# Patient Record
Sex: MALE | Race: WHITE | NOT HISPANIC OR LATINO | Employment: FULL TIME | URBAN - METROPOLITAN AREA
[De-identification: names, ages, dates, MRNs, and addresses within clinical notes are randomized per-mention and may not be internally consistent; named-entity substitution may affect disease eponyms.]

---

## 2023-04-18 ENCOUNTER — HOSPITAL ENCOUNTER (OUTPATIENT)
Dept: DATA CONVERSION | Facility: HOSPITAL | Age: 53
End: 2023-04-18
Attending: ORTHOPAEDIC SURGERY | Admitting: ORTHOPAEDIC SURGERY

## 2023-04-18 DIAGNOSIS — M76.72 PERONEAL TENDINITIS, LEFT LEG: ICD-10-CM

## 2023-04-20 LAB
COMPLETE PATHOLOGY REPORT: NORMAL
CONVERTED CLINICAL DIAGNOSIS-HISTORY: NORMAL
CONVERTED FINAL DIAGNOSIS: NORMAL
CONVERTED FINAL REPORT PDF LINK TO COPY AND PASTE: NORMAL
CONVERTED GROSS DESCRIPTION: NORMAL

## 2023-09-07 VITALS
RESPIRATION RATE: 16 BRPM | WEIGHT: 237.44 LBS | HEART RATE: 82 BPM | SYSTOLIC BLOOD PRESSURE: 121 MMHG | HEIGHT: 70 IN | BODY MASS INDEX: 33.99 KG/M2 | TEMPERATURE: 96.8 F | DIASTOLIC BLOOD PRESSURE: 79 MMHG

## 2023-09-14 NOTE — H&P
History of Present Illness:   History Present Illness:  Reason for surgery: tendinosis of the peroneus longus  and brevis tendons   HPI:    This 52-year-old gentleman is seen in consultation at the request of Dr. Sukumar Mazariegos. The patient's had longstanding left lateral ankle pain. Patient notes he  injured his ankle in 2015 and that is when the problem began. He had an inversion injury at that time which was treated with an Ace wrap icing and rest. Patient notes he has had some intermittent problems since that time but it has been particularly bad  over the last 8 months. He denies swelling but notes pain with extended weightbearing activities. Patient notes the pain is posterior to the lateral malleolus. The patient denies any neurologic symptoms in the lower extremity. Patient denies any night  pain.patient's had physical therapy and stretching exercises that has not alleviated his symptoms. He has been taking meloxicam and that has not alleviated his symptoms.    Allergies:        Allergies:  ·  No Known Allergies :     Home Medication Review:   Home Medications Reviewed: yes     Impression/Procedure:   ·  Impression and Planned Procedure: tendinosis of the peroneus longus and brevis tendons with a split tear of the peroneus brevis tendon. Patient has failed conservative care.would recommend left ankle  exploration peroneus brevis tendon and repair and/or resection tendon tear       ERAS (Enhanced Recovery After Surgery):  ·  ERAS Patient: no       Vital Signs:  Temperature C: 36 degrees C   Temperature F: 96.8 degrees F   Heart Rate: 82 beats per minute   Respiratory Rate: 16 breath per minute   Blood Pressure Systolic: 121 mm/Hg   Blood Pressure Diastolic: 79 mm/Hg     Physical Exam by System:    Constitutional: Well-appearing, no acute distress.  A&O x4   Eyes: EOMI, PERRL   ENMT: MMM   Head/Neck: NCAT, Trachea midline   Respiratory/Thorax: Regular work of breathing.   Cardiovascular: RRR on peripheral  pulses   Gastrointestinal: Abdomen non-distended, non-tender   Musculoskeletal: of the patient's left ankle revealed  the following. The patient's gait and stance revealed mild cavovarus stance and gait. There did not appear to be any skin abnormalities or lymphangitis. There was mild swelling noted over the lateral ankle and lateral hindfoot. There was no erythema.  There was no deformity.was tenderness to palpation over the peroneal tendons posterior to the lateral malleolus down to the insertion at the base the fifth metatarsal. Peroneal tendon function was slightly weak 4 out of 5 strength. Ankle range of motion  was full. Subtalar motion was full and midtarsal motion was full. The Silf[place] test was positive. There was full ligamentous stability.neurovascular examination was intact. The neurological exam including motor and sensory exam was performed. The  vascular examination including palpation of pulses and capillary refill of the foot was performed and determined to be intact.   Psychological: Normal mood and affect   Skin: No rashes or lesions     Consent:   COVID-19 Consent:  ·  COVID-19 Risk Consent Surgeon has reviewed key risks related to the risk of devante COVID-19 and if they contract COVID-19 what the risks are.     Attestation:   Note Completion:  I am a:  Resident/Fellow   Attending Attestation I saw and evaluated the patient.  I personally obtained the key and critical portions of the history and physical exam or was physically present for key and  critical portions performed by the resident/fellow. I reviewed the resident/fellow?s documentation and discussed the patient with the resident/fellow.  I agree with the resident/fellow?s medical decision making as documented in the note.     I personally evaluated the patient on 18-Apr-2023         Electronic Signatures:  Sang Meza)  (Signed 20-Apr-2023 08:43)   Authored: Physical Exam, Note Completion   Co-Signer: History of Present  Illness, Allergies, Home Medication Review, Impression/Procedure, ERAS, Physical Exam, Consent, Note Completion  Saurabh Garcia (Resident))  (Signed 18-Apr-2023 10:36)   Authored: History of Present Illness, Allergies, Home  Medication Review, Impression/Procedure, ERAS, Physical Exam, Consent, Note Completion      Last Updated: 20-Apr-2023 08:43 by Sang Meza)

## 2023-10-02 NOTE — OP NOTE
PROCEDURE DETAILS    Preoperative Diagnosis:  Left peroneus brevis tear  Left peroneal sheath tenosynovitis   Postoperative Diagnosis:  Left peroneus brevis tear  Left peroneal sheath tenosynovitis   Surgeon: Dr. Meza   Resident/Fellow/Other Assistant: Radha    Procedure:  1) Left peroneal tendon debridement   2) Left peroneus brevis tubularization   Anesthesia: LMA  Estimated Blood Loss: < 5 cc  Findings: Significant peroneal tendon tenosynovitis with < 50% tear in distal peroneus brevis   Specimens(s) Collected: yes,  Tendon tear x1 and tenosynovitis x1   Complications: None  Urine Output: None  Drains and/or Catheters: None  Implants: None  Tourniquet Times: 63 min at 250 mmHg  Additional Details: Weightbearing Status/precautions: NWB LLE  Imaging: None  Perioperative ABx: None   DVT PPx: ASA 81 mg BID  Dressing: Splint  Drain: None  [place]: None    Dispo: Home from PACU  Please call or page with questions or concerns.   Patient Returned To/Condition: PACU to home/Good                                Attestation:   Note Completion:  Attending Attestation I was present for the entire procedure    I am a: Resident/Fellow         Electronic Signatures:  Sang Meza)  (Signed 20-Apr-2023 08:44)   Authored: Note Completion   Co-Signer: Post-Operative Note, Chart Review, Note Completion  Saurabh Garcia (Resident))  (Signed 18-Apr-2023 13:06)   Authored: Post-Operative Note, Chart Review, Note Completion      Last Updated: 20-Apr-2023 08:44 by Sang Meza)

## 2024-04-19 NOTE — OP NOTE
PREOPERATIVE DIAGNOSIS:  1. Torn peroneus brevis tendon, left ankle.  2. Tenosynovitis, peroneal tendons.    POSTOPERATIVE DIAGNOSIS:  1. Torn peroneus brevis tendon, left ankle.  2. Tenosynovitis, peroneal tendons.    OPERATION/PROCEDURE:  1. Tenosynovectomy of peroneal tendons.  2. Repair of peroneus brevis tendon tear.    SURGEON:  Sang Meza MD.    ASSISTANT(S):  Saurabh Garcia.    ANESTHESIA:  General with regional block.    CLINICAL NOTE:  This 53-year-old gentleman believes he injured his ankle playing  pickle ball approximately 18 months ago.  The patient has failed good  conservative care.  Physical examination was consistent with peroneal  tendon dysfunction and tear and these findings were confirmed  radiologically with an MRI.  After adequate informed consent, the  patient was scheduled for surgical intervention.     The operative site was marked in the preoperative holding area.  A  time-out was held in the operating room prior to the surgery and the  patient was given appropriate intravenous antibiotics.     DESCRIPTION OF OPERATION:  The patient was brought into the operating room for adequate general  anesthesia and preoperative intravenous antibiotics and regional  block.  The patient was positioned in supine position, prepped and  draped in usual manner.  Pneumatic tourniquet was inflated to 250  mmHg.  A curved posterior lateral incision was made over the peroneal  tendons.  Adequate hemostasis was obtained here and throughout the  case utilizing electrocautery.  The peroneal tendon sheath was opened  proximal to the lateral malleolus and distal to the lateral  malleolus, preserving approximately 4 cm of the tendon sheath  directly posterior to the lateral malleolus.  The tendons were  examined.  There were severe adhesions and tenosynovitis noted.  This  was debrided sharply and the specimen was sent to Pathology for  permanent section.  Just distal to the lateral malleolus was a 2  cm  degenerative longitudinal tear in the peroneus brevis tendon.  The  tendon tear was debrided, specimen sent to Pathology for permanent  section and tubularized with running Vicryl suture.  The wound was  then copiously irrigated.  The tendon sheath was closed proximal to  the lateral malleolus, preserving the portion that was directly  posterior to the lateral malleolus.  The wound was then closed in  layers using absorbable suture with nonabsorbable 4-0 nylon in a  vertical mattress configuration of the skin.  A dry sterile dressing  applied followed by posterior splint and sugar-tong.  The patient  tolerated the procedure well, left the operating room in apparent  good condition.  The patient was asked to keep the dressing clean,  dry, and intact as well as the splint.  He is to remain  nonweightbearing.  He was started on aspirin for DVT prophylaxis.  He  was asked to call if any problems arise and return to the office in a  week.       Sang Meza MD    DD:  04/18/2023 12:43:03 EST  DT:  04/18/2023 14:02:31 EST  DICTATION NUMBER:  [id number]  INTERNAL JOB NUMBER:  [id number]    CC:  Sang Meza MD, Fax: [phone]       Electronic Signatures:  Sang Meza) (Signed on 20-Apr-2023 18:06)   Authored   Unsigned, Draft (SYS GENERATED) (Entered on 18-Apr-2023 14:02)   Entered     Last Updated: 20-Apr-2023 18:06 by Sang Meza)